# Patient Record
Sex: FEMALE | Race: BLACK OR AFRICAN AMERICAN | NOT HISPANIC OR LATINO | URBAN - METROPOLITAN AREA
[De-identification: names, ages, dates, MRNs, and addresses within clinical notes are randomized per-mention and may not be internally consistent; named-entity substitution may affect disease eponyms.]

---

## 2022-09-28 ENCOUNTER — OFFICE (OUTPATIENT)
Dept: URBAN - METROPOLITAN AREA CLINIC 11 | Facility: CLINIC | Age: 72
End: 2022-09-28
Payer: COMMERCIAL

## 2022-09-28 VITALS
HEIGHT: 65 IN | OXYGEN SATURATION: 99 % | WEIGHT: 165 LBS | HEART RATE: 75 BPM | SYSTOLIC BLOOD PRESSURE: 157 MMHG | DIASTOLIC BLOOD PRESSURE: 88 MMHG

## 2022-09-28 DIAGNOSIS — K21.9 GASTRO-ESOPHAGEAL REFLUX DISEASE WITHOUT ESOPHAGITIS: ICD-10-CM

## 2022-09-28 DIAGNOSIS — R53.83 OTHER FATIGUE: ICD-10-CM

## 2022-09-28 DIAGNOSIS — F41.9 ANXIETY DISORDER, UNSPECIFIED: ICD-10-CM

## 2022-09-28 LAB
CBC, PLATELET, NO DIFFERENTIAL: HEMATOCRIT: 41.5 % (ref 34–46.6)
CBC, PLATELET, NO DIFFERENTIAL: HEMOGLOBIN: 12.9 G/DL (ref 11.1–15.9)
CBC, PLATELET, NO DIFFERENTIAL: MCH: 27.4 PG (ref 26.6–33)
CBC, PLATELET, NO DIFFERENTIAL: MCHC: 31.1 G/DL — LOW (ref 31.5–35.7)
CBC, PLATELET, NO DIFFERENTIAL: MCV: 88 FL (ref 79–97)
CBC, PLATELET, NO DIFFERENTIAL: PLATELETS: 436 X10E3/UL (ref 150–450)
CBC, PLATELET, NO DIFFERENTIAL: RBC: 4.71 X10E6/UL (ref 3.77–5.28)
CBC, PLATELET, NO DIFFERENTIAL: RDW: 13.1 % (ref 11.7–15.4)
CBC, PLATELET, NO DIFFERENTIAL: WBC: 8.7 X10E3/UL (ref 3.4–10.8)
FE+TIBC+FER: FERRITIN: 123 NG/ML (ref 15–150)
FE+TIBC+FER: IRON BIND.CAP.(TIBC): 300 UG/DL (ref 250–450)
FE+TIBC+FER: IRON SATURATION: 17 % (ref 15–55)
FE+TIBC+FER: IRON: 50 UG/DL (ref 27–139)
FE+TIBC+FER: UIBC: 250 UG/DL (ref 118–369)

## 2022-09-28 PROCEDURE — 99204 OFFICE O/P NEW MOD 45 MIN: CPT

## 2022-09-28 NOTE — SERVICEHPINOTES
Raven  is an extremely pleasant 72-year-old  female who presents to establish GI care.  She believes she is due for colonoscopy.  She reports her last colonoscopy was greater than 10 years ago.  She does not recall who performed it but believes it was done in [place].  Her daughter states she believes it was normal other than internal hemorrhoids and diverticulosis.  Patient or cyst a longstanding history of GERD reports she has been on omeprazole for a while.  She was previously on 40 mg but reports they recently decrease her to 10 mg once a day in july/august .and her GERD symptoms actually improved with the reduced dose.  She reports the symptoms are managed well now.  She denies any nausea, vomiting, dysphagia, odynophagia, heartburn or reflux.  With regard to bowel habits, she reports this varies from every day to every 2-3 days.  She states that his food dependent and also depends on if she drinks enough water.  When she is regular, she reports daily bowel movement of normal color and consistency without blood or mucus.  She states she will often not drink enough water and will then have a hard stool.  She does not use any over-the-counter remedies, states she just drinks more water and this helps.  She denies any blood or mucus in her stool.  She does endorse fatigue and significant stress.  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  Not long after that, she was required to take in her 7-year-old great grandson and is still his primary caregiver.  She states she has a good appetite most of the time, but this sometimes is decreased due to stress.  When asked about her thoughts on low-dose antidepressant /antianxiety, patient reports she has a prescription for lexapro at home but daughter reports she does not like to take meds.  after lengthy discussion, patient is agreeable to trying Lexapro.  Informed her that she will need to give it several weeks to determine if it is going to help her or not.
br
br   Other than hypertension, patient denies any known cardiac, pulmonary, renal, hematologic or neurologic disorders.   She denies any tobacco, alcohol or drug use.

## 2022-09-28 NOTE — SERVICENOTES
Patient has Lexapro at home that has been prescribed her but daughter reports she has never wanted to take it.  We discussed antidepressants at length.  Patient asked if I would take it.  I informed her that I am currently on this medication, at which point she told her daughter she is happy to try it now.

We discussed the risks and benefits of colonoscopy.  I explained the risk of bleeding, infection, perforation requiring emergent surgery as well as anesthesia related complications including heart attack, stroke, or pneumonia.  Patient voiced understanding and agrees to proceed.

## 2022-11-09 ENCOUNTER — AMBULATORY SURGICAL CENTER (OUTPATIENT)
Dept: URBAN - METROPOLITAN AREA SURGERY 3 | Facility: SURGERY | Age: 72
End: 2022-11-09
Payer: COMMERCIAL

## 2022-11-09 ENCOUNTER — OFFICE (OUTPATIENT)
Dept: URBAN - METROPOLITAN AREA PATHOLOGY 22 | Facility: PATHOLOGY | Age: 72
End: 2022-11-09
Payer: COMMERCIAL

## 2022-11-09 VITALS
RESPIRATION RATE: 23 BRPM | OXYGEN SATURATION: 95 % | HEART RATE: 71 BPM | DIASTOLIC BLOOD PRESSURE: 69 MMHG | DIASTOLIC BLOOD PRESSURE: 64 MMHG | DIASTOLIC BLOOD PRESSURE: 74 MMHG | DIASTOLIC BLOOD PRESSURE: 74 MMHG | HEART RATE: 67 BPM | DIASTOLIC BLOOD PRESSURE: 81 MMHG | DIASTOLIC BLOOD PRESSURE: 64 MMHG | TEMPERATURE: 98.3 F | HEART RATE: 71 BPM | TEMPERATURE: 98.3 F | SYSTOLIC BLOOD PRESSURE: 109 MMHG | OXYGEN SATURATION: 99 % | OXYGEN SATURATION: 98 % | DIASTOLIC BLOOD PRESSURE: 81 MMHG | DIASTOLIC BLOOD PRESSURE: 74 MMHG | DIASTOLIC BLOOD PRESSURE: 64 MMHG | OXYGEN SATURATION: 98 % | TEMPERATURE: 97.7 F | HEART RATE: 67 BPM | HEART RATE: 67 BPM | DIASTOLIC BLOOD PRESSURE: 63 MMHG | OXYGEN SATURATION: 99 % | HEART RATE: 71 BPM | SYSTOLIC BLOOD PRESSURE: 109 MMHG | SYSTOLIC BLOOD PRESSURE: 132 MMHG | TEMPERATURE: 98.3 F | RESPIRATION RATE: 17 BRPM | RESPIRATION RATE: 17 BRPM | DIASTOLIC BLOOD PRESSURE: 69 MMHG | DIASTOLIC BLOOD PRESSURE: 81 MMHG | SYSTOLIC BLOOD PRESSURE: 122 MMHG | WEIGHT: 165 LBS | HEIGHT: 65 IN | OXYGEN SATURATION: 97 % | RESPIRATION RATE: 14 BRPM | HEART RATE: 77 BPM | SYSTOLIC BLOOD PRESSURE: 113 MMHG | OXYGEN SATURATION: 95 % | RESPIRATION RATE: 16 BRPM | HEART RATE: 72 BPM | RESPIRATION RATE: 23 BRPM | RESPIRATION RATE: 17 BRPM | OXYGEN SATURATION: 98 % | RESPIRATION RATE: 14 BRPM | TEMPERATURE: 97.7 F | SYSTOLIC BLOOD PRESSURE: 113 MMHG | RESPIRATION RATE: 25 BRPM | OXYGEN SATURATION: 95 % | SYSTOLIC BLOOD PRESSURE: 109 MMHG | HEIGHT: 65 IN | DIASTOLIC BLOOD PRESSURE: 63 MMHG | OXYGEN SATURATION: 97 % | RESPIRATION RATE: 25 BRPM | TEMPERATURE: 97.7 F | HEART RATE: 77 BPM | SYSTOLIC BLOOD PRESSURE: 132 MMHG | SYSTOLIC BLOOD PRESSURE: 113 MMHG | RESPIRATION RATE: 14 BRPM | WEIGHT: 165 LBS | DIASTOLIC BLOOD PRESSURE: 63 MMHG | HEART RATE: 72 BPM | RESPIRATION RATE: 25 BRPM | SYSTOLIC BLOOD PRESSURE: 122 MMHG | SYSTOLIC BLOOD PRESSURE: 132 MMHG | HEIGHT: 65 IN | HEART RATE: 77 BPM | WEIGHT: 165 LBS | RESPIRATION RATE: 16 BRPM | DIASTOLIC BLOOD PRESSURE: 69 MMHG | RESPIRATION RATE: 16 BRPM | SYSTOLIC BLOOD PRESSURE: 122 MMHG | HEART RATE: 72 BPM | OXYGEN SATURATION: 99 % | RESPIRATION RATE: 23 BRPM | OXYGEN SATURATION: 97 %

## 2022-11-09 DIAGNOSIS — K57.30 DIVERTICULOSIS OF LARGE INTESTINE WITHOUT PERFORATION OR ABS: ICD-10-CM

## 2022-11-09 DIAGNOSIS — Z12.11 ENCOUNTER FOR SCREENING FOR MALIGNANT NEOPLASM OF COLON: ICD-10-CM

## 2022-11-09 DIAGNOSIS — D12.2 BENIGN NEOPLASM OF ASCENDING COLON: ICD-10-CM

## 2022-11-09 PROBLEM — K63.5 POLYP OF COLON: Status: ACTIVE | Noted: 2022-11-09

## 2022-11-09 PROCEDURE — 45385 COLONOSCOPY W/LESION REMOVAL: CPT | Mod: PT | Performed by: INTERNAL MEDICINE

## 2022-11-09 PROCEDURE — G8918 PT W/O PREOP ORDER IV AB PRO: HCPCS | Performed by: INTERNAL MEDICINE

## 2022-11-09 NOTE — SERVICEHPINOTES
Ms. Espinal is a 71 yo AAF here for screening colonoscopy. She has had some weight loss, but relates that to increased stress. No iron deficiency.

## 2022-11-09 NOTE — SERVICEHPINOTES
Ms. Espinal is a 73 yo AAF here for screening colonoscopy. She has had some weight loss, but relates that to increased stress. No iron deficiency.

## 2022-12-28 ENCOUNTER — OFFICE (OUTPATIENT)
Dept: URBAN - METROPOLITAN AREA CLINIC 11 | Facility: CLINIC | Age: 72
End: 2022-12-28
Payer: COMMERCIAL

## 2022-12-28 VITALS
HEART RATE: 83 BPM | WEIGHT: 159 LBS | HEIGHT: 65 IN | DIASTOLIC BLOOD PRESSURE: 79 MMHG | OXYGEN SATURATION: 97 % | SYSTOLIC BLOOD PRESSURE: 147 MMHG

## 2022-12-28 DIAGNOSIS — K21.9 GASTRO-ESOPHAGEAL REFLUX DISEASE WITHOUT ESOPHAGITIS: ICD-10-CM

## 2022-12-28 DIAGNOSIS — F41.9 ANXIETY DISORDER, UNSPECIFIED: ICD-10-CM

## 2022-12-28 PROCEDURE — 99214 OFFICE O/P EST MOD 30 MIN: CPT

## 2022-12-28 RX ORDER — ESCITALOPRAM OXALATE 10 MG/1
10 TABLET, FILM COATED ORAL
Qty: 30 | Refills: 6 | Status: ACTIVE
Start: 2022-12-28

## 2022-12-28 NOTE — SERVICENOTES
Patient previously prescribed Lexapro by her PCP but never took medication.  We discussed this at length.  Recommended that she trial this and give it at least 3-4 weeks.  She denies any suicidal thoughts or ideations.  We discussed that if she decided she does not like the medication, she cannot stop this abruptly and will need to taper the dose.  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  She continues having increased stress/anxiety.  Her great grandson is doing well and making good grades.  Her oldest son continues to have problems with his health and his bipolar disorder.

## 2022-12-28 NOTE — SERVICEHPINOTES
Raven is an extremely pleasant 72-year-old  female who presented 9/28/22 to establish GI care. She believed she was due for colonoscopy. She reported her last colonoscopy was greater than 10 years ago. She did not recall who performed it but believed it was done in [place]. Her daughter stated she believed it was normal other than internal hemorrhoids and diverticulosis. Patient endorsed a longstanding history of GERD &amp reported she had been on omeprazole for a while. She was previously on 40 mg but reported they recently decreased her to 10 mg once a day in july/august and her GERD symptoms actually improved with the reduced dose. She reported the symptoms were managed well now. She denied any nausea, vomiting, dysphagia, odynophagia, heartburn or reflux. With regard to bowel habits, she reported this varies from every day to every 2-3 days. She stated that it is food dependent and also depends on if she drinks enough water. When she is regular, she reported daily bowel movement of normal color and consistency without blood or mucus. She stated she will often not drink enough water and will then have a hard stool. She does not use any over-the-counter remedies, stated she just drinks more water and this helps. She denied any blood or mucus in her stool. She did endorse fatigue and significant stress. 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 Not long after that, she was required to take in her 7-year-old great grandson and was still his primary caregiver. She stated she had a good appetite most of the time, but this sometimes was decreased due to stress. When asked about her thoughts on low-dose antidepressant /antianxiety, patient reported she has a prescription for lexapro at home but daughter reported she does not like to take meds. After lengthy discussion, patient was agreeable to trying Lexapro. Informed her that she will need to give it several weeks to determine if it is going to help her or not.Other than hypertension, patient denied any known cardiac, pulmonary, renal, hematologic or neurologic disorders.  She denied any tobacco, alcohol or drug use. She underwent colonoscopy on 11/09/2022, which was notable for rectal skin tag, internal hemorrhoids, mild diverticulosis of the whole colon, a 5 millimeter polyp in the ascending colon (TA) and a 3 millimeter polyp in the cecum (hyperplastic polyp). No further surveillance colonoscopy is recommended.
liang tavera   On follow-up today, patient reports she is overall doing well.  She continues taking her omeprazole and reports this manages her GERD symptoms well.  She reports her bowels have normalized and she now has a bowel movement every day to every other day of normal color and consistency without blood or mucus.  She reports she continues to drink plenty of water.  She does endorse continued significant stress.liang